# Patient Record
Sex: FEMALE | Race: WHITE | NOT HISPANIC OR LATINO | Employment: FULL TIME | URBAN - METROPOLITAN AREA
[De-identification: names, ages, dates, MRNs, and addresses within clinical notes are randomized per-mention and may not be internally consistent; named-entity substitution may affect disease eponyms.]

---

## 2018-07-12 ENCOUNTER — CONVERSION ENCOUNTER (OUTPATIENT)
Dept: GENERAL RADIOLOGY | Facility: HOSPITAL | Age: 43
End: 2018-07-12

## 2018-10-30 ENCOUNTER — OFFICE VISIT CONVERTED (OUTPATIENT)
Dept: CARDIOLOGY | Facility: CLINIC | Age: 43
End: 2018-10-30
Attending: SPECIALIST

## 2018-10-30 ENCOUNTER — CONVERSION ENCOUNTER (OUTPATIENT)
Dept: CARDIOLOGY | Facility: CLINIC | Age: 43
End: 2018-10-30

## 2019-11-05 ENCOUNTER — OFFICE VISIT CONVERTED (OUTPATIENT)
Dept: SURGERY | Facility: CLINIC | Age: 44
End: 2019-11-05
Attending: PHYSICIAN ASSISTANT

## 2019-11-05 ENCOUNTER — HOSPITAL ENCOUNTER (OUTPATIENT)
Dept: SURGERY | Facility: CLINIC | Age: 44
Discharge: HOME | End: 2019-11-05
Attending: PHYSICIAN ASSISTANT

## 2019-11-07 LAB — BACTERIA UR CULT: NORMAL

## 2019-11-11 ENCOUNTER — HOSPITAL ENCOUNTER (OUTPATIENT)
Dept: GENERAL RADIOLOGY | Facility: HOSPITAL | Age: 44
Discharge: HOME | End: 2019-11-11
Attending: PHYSICIAN ASSISTANT

## 2019-12-10 ENCOUNTER — OFFICE VISIT CONVERTED (OUTPATIENT)
Dept: SURGERY | Facility: CLINIC | Age: 44
End: 2019-12-10
Attending: PHYSICIAN ASSISTANT

## 2021-05-15 VITALS — RESPIRATION RATE: 12 BRPM | BODY MASS INDEX: 41.14 KG/M2 | HEIGHT: 66 IN | WEIGHT: 256 LBS

## 2021-05-15 VITALS — HEIGHT: 66 IN | WEIGHT: 255.12 LBS | BODY MASS INDEX: 41 KG/M2 | RESPIRATION RATE: 16 BRPM

## 2021-05-16 VITALS
WEIGHT: 255 LBS | HEART RATE: 98 BPM | HEIGHT: 66 IN | SYSTOLIC BLOOD PRESSURE: 126 MMHG | DIASTOLIC BLOOD PRESSURE: 84 MMHG | BODY MASS INDEX: 40.98 KG/M2

## 2021-10-05 ENCOUNTER — TRANSCRIBE ORDERS (OUTPATIENT)
Dept: ADMINISTRATIVE | Facility: HOSPITAL | Age: 46
End: 2021-10-05

## 2021-10-05 DIAGNOSIS — Z12.31 SCREENING MAMMOGRAM, ENCOUNTER FOR: Primary | ICD-10-CM

## 2021-12-28 ENCOUNTER — HOSPITAL ENCOUNTER (OUTPATIENT)
Dept: MAMMOGRAPHY | Facility: HOSPITAL | Age: 46
Discharge: HOME | End: 2021-12-28
Admitting: NURSE PRACTITIONER

## 2021-12-28 DIAGNOSIS — Z12.31 SCREENING MAMMOGRAM, ENCOUNTER FOR: ICD-10-CM

## 2021-12-28 PROCEDURE — 77067 SCR MAMMO BI INCL CAD: CPT

## 2021-12-28 PROCEDURE — 77063 BREAST TOMOSYNTHESIS BI: CPT

## 2022-03-16 ENCOUNTER — APPOINTMENT (OUTPATIENT)
Dept: GENERAL RADIOLOGY | Facility: HOSPITAL | Age: 47
End: 2022-03-16

## 2022-03-16 ENCOUNTER — HOSPITAL ENCOUNTER (EMERGENCY)
Facility: HOSPITAL | Age: 47
Discharge: HOME | End: 2022-03-16
Attending: EMERGENCY MEDICINE | Admitting: EMERGENCY MEDICINE

## 2022-03-16 VITALS
HEIGHT: 66 IN | TEMPERATURE: 98.8 F | SYSTOLIC BLOOD PRESSURE: 120 MMHG | OXYGEN SATURATION: 94 % | RESPIRATION RATE: 18 BRPM | WEIGHT: 266.1 LBS | HEART RATE: 99 BPM | BODY MASS INDEX: 42.77 KG/M2 | DIASTOLIC BLOOD PRESSURE: 71 MMHG

## 2022-03-16 DIAGNOSIS — R07.9 CHEST PAIN OF UNKNOWN ETIOLOGY: Primary | ICD-10-CM

## 2022-03-16 LAB
ALBUMIN SERPL-MCNC: 4.5 G/DL (ref 3.5–5.2)
ALBUMIN/GLOB SERPL: 1.4 G/DL
ALP SERPL-CCNC: 58 U/L (ref 39–117)
ALT SERPL W P-5'-P-CCNC: 8 U/L (ref 1–33)
ANION GAP SERPL CALCULATED.3IONS-SCNC: 12.5 MMOL/L (ref 5–15)
AST SERPL-CCNC: 9 U/L (ref 1–32)
BASOPHILS # BLD AUTO: 0.03 10*3/MM3 (ref 0–0.2)
BASOPHILS NFR BLD AUTO: 0.3 % (ref 0–1.5)
BILIRUB SERPL-MCNC: 0.2 MG/DL (ref 0–1.2)
BUN SERPL-MCNC: 17 MG/DL (ref 6–20)
BUN/CREAT SERPL: 22.7 (ref 7–25)
CALCIUM SPEC-SCNC: 10.6 MG/DL (ref 8.6–10.5)
CHLORIDE SERPL-SCNC: 101 MMOL/L (ref 98–107)
CK MB SERPL-CCNC: 1.49 NG/ML
CK SERPL-CCNC: 53 U/L (ref 20–180)
CO2 SERPL-SCNC: 23.5 MMOL/L (ref 22–29)
CREAT SERPL-MCNC: 0.75 MG/DL (ref 0.57–1)
DEPRECATED RDW RBC AUTO: 42.8 FL (ref 37–54)
EGFRCR SERPLBLD CKD-EPI 2021: 99.6 ML/MIN/1.73
EOSINOPHIL # BLD AUTO: 0.28 10*3/MM3 (ref 0–0.4)
EOSINOPHIL NFR BLD AUTO: 3.2 % (ref 0.3–6.2)
ERYTHROCYTE [DISTWIDTH] IN BLOOD BY AUTOMATED COUNT: 13.8 % (ref 12.3–15.4)
GLOBULIN UR ELPH-MCNC: 3.3 GM/DL
GLUCOSE SERPL-MCNC: 126 MG/DL (ref 65–99)
HCT VFR BLD AUTO: 38.8 % (ref 34–46.6)
HGB BLD-MCNC: 12.8 G/DL (ref 12–15.9)
HOLD SPECIMEN: NORMAL
HOLD SPECIMEN: NORMAL
IMM GRANULOCYTES # BLD AUTO: 0.03 10*3/MM3 (ref 0–0.05)
IMM GRANULOCYTES NFR BLD AUTO: 0.3 % (ref 0–0.5)
LIPASE SERPL-CCNC: 31 U/L (ref 13–60)
LYMPHOCYTES # BLD AUTO: 2.35 10*3/MM3 (ref 0.7–3.1)
LYMPHOCYTES NFR BLD AUTO: 26.9 % (ref 19.6–45.3)
MAGNESIUM SERPL-MCNC: 1.5 MG/DL (ref 1.6–2.6)
MCH RBC QN AUTO: 27.9 PG (ref 26.6–33)
MCHC RBC AUTO-ENTMCNC: 33 G/DL (ref 31.5–35.7)
MCV RBC AUTO: 84.7 FL (ref 79–97)
MONOCYTES # BLD AUTO: 0.5 10*3/MM3 (ref 0.1–0.9)
MONOCYTES NFR BLD AUTO: 5.7 % (ref 5–12)
NEUTROPHILS NFR BLD AUTO: 5.54 10*3/MM3 (ref 1.7–7)
NEUTROPHILS NFR BLD AUTO: 63.6 % (ref 42.7–76)
NRBC BLD AUTO-RTO: 0 /100 WBC (ref 0–0.2)
NT-PROBNP SERPL-MCNC: 26.9 PG/ML (ref 0–450)
PLATELET # BLD AUTO: 215 10*3/MM3 (ref 140–450)
PMV BLD AUTO: 10.1 FL (ref 6–12)
POTASSIUM SERPL-SCNC: 4.1 MMOL/L (ref 3.5–5.2)
PROT SERPL-MCNC: 7.8 G/DL (ref 6–8.5)
QT INTERVAL: 334 MS
QT INTERVAL: 350 MS
RBC # BLD AUTO: 4.58 10*6/MM3 (ref 3.77–5.28)
SODIUM SERPL-SCNC: 137 MMOL/L (ref 136–145)
TROPONIN I SERPL-MCNC: 0 NG/ML (ref 0–0.6)
TROPONIN I SERPL-MCNC: 0 NG/ML (ref 0–0.6)
WBC NRBC COR # BLD: 8.73 10*3/MM3 (ref 3.4–10.8)
WHOLE BLOOD HOLD SPECIMEN: NORMAL
WHOLE BLOOD HOLD SPECIMEN: NORMAL

## 2022-03-16 PROCEDURE — 36415 COLL VENOUS BLD VENIPUNCTURE: CPT

## 2022-03-16 PROCEDURE — 80053 COMPREHEN METABOLIC PANEL: CPT

## 2022-03-16 PROCEDURE — 93005 ELECTROCARDIOGRAM TRACING: CPT | Performed by: EMERGENCY MEDICINE

## 2022-03-16 PROCEDURE — 82553 CREATINE MB FRACTION: CPT | Performed by: EMERGENCY MEDICINE

## 2022-03-16 PROCEDURE — 83735 ASSAY OF MAGNESIUM: CPT

## 2022-03-16 PROCEDURE — 83690 ASSAY OF LIPASE: CPT

## 2022-03-16 PROCEDURE — 36415 COLL VENOUS BLD VENIPUNCTURE: CPT | Performed by: EMERGENCY MEDICINE

## 2022-03-16 PROCEDURE — 84484 ASSAY OF TROPONIN QUANT: CPT

## 2022-03-16 PROCEDURE — 85025 COMPLETE CBC W/AUTO DIFF WBC: CPT

## 2022-03-16 PROCEDURE — 99283 EMERGENCY DEPT VISIT LOW MDM: CPT

## 2022-03-16 PROCEDURE — 83880 ASSAY OF NATRIURETIC PEPTIDE: CPT

## 2022-03-16 PROCEDURE — 93005 ELECTROCARDIOGRAM TRACING: CPT

## 2022-03-16 PROCEDURE — 82550 ASSAY OF CK (CPK): CPT | Performed by: EMERGENCY MEDICINE

## 2022-03-16 PROCEDURE — 71045 X-RAY EXAM CHEST 1 VIEW: CPT

## 2022-03-16 RX ORDER — LEVOTHYROXINE SODIUM 0.1 MG/1
100 TABLET ORAL DAILY
COMMUNITY

## 2022-03-16 RX ORDER — ASPIRIN 81 MG/1
324 TABLET, CHEWABLE ORAL ONCE
Status: COMPLETED | OUTPATIENT
Start: 2022-03-16 | End: 2022-03-16

## 2022-03-16 RX ORDER — BIOTIN 1000 MCG
TABLET,CHEWABLE ORAL
COMMUNITY

## 2022-03-16 RX ORDER — OMEPRAZOLE 40 MG/1
40 CAPSULE, DELAYED RELEASE ORAL DAILY
Status: ON HOLD | COMMUNITY
End: 2022-04-19

## 2022-03-16 RX ORDER — AMITRIPTYLINE HYDROCHLORIDE 150 MG/1
150 TABLET, FILM COATED ORAL DAILY
COMMUNITY

## 2022-03-16 RX ORDER — DULAGLUTIDE 1.5 MG/.5ML
0.5 INJECTION, SOLUTION SUBCUTANEOUS WEEKLY
COMMUNITY

## 2022-03-16 RX ORDER — ESCITALOPRAM OXALATE 10 MG/1
10 TABLET ORAL DAILY
COMMUNITY

## 2022-03-16 RX ORDER — SODIUM CHLORIDE 0.9 % (FLUSH) 0.9 %
10 SYRINGE (ML) INJECTION AS NEEDED
Status: DISCONTINUED | OUTPATIENT
Start: 2022-03-16 | End: 2022-03-16 | Stop reason: HOSPADM

## 2022-03-16 RX ADMIN — ASPIRIN 81 MG CHEWABLE TABLET 324 MG: 81 TABLET CHEWABLE at 13:37

## 2022-03-16 NOTE — ED PROVIDER NOTES
Time: 4:09 PM EDT  Arrived by: private car  Chief Complaint: Chest pain, headache  History provided by: Patient  History is limited by: N/A     History of Present Illness:  Patient is a 46 y.o. year old female that presents to the emergency department with complaint of intermittent chest pain and headache over the past week.  Patient took 2 sublingual nitroglycerin prior to arrival.  Patient states her headache is slightly worse but her chest pain is resolved.    HPI    Similar Symptoms Previously: Yes  Recently seen: No      Patient Care Team  Primary Care Provider: Ellie June APRN    Past Medical History:     No Known Allergies  Past Medical History:   Diagnosis Date   • Diabetes mellitus (HCC)    • Hyperlipidemia    • Hypertension    • Migraine      Past Surgical History:   Procedure Laterality Date   •  SECTION     • CHOLECYSTECTOMY       History reviewed. No pertinent family history.    Home Medications:  Prior to Admission medications    Medication Sig Start Date End Date Taking? Authorizing Provider   amitriptyline (ELAVIL) 100 MG tablet 150 mg Daily.    ProviderMaribel MD   Biotin 1000 MCG chewable tablet biotin 1,000 mcg oral tablet,chewable chew 1 tablet by oral route daily   Active    ProviderMaribel MD   Cholecalciferol 25 MCG (1000 UT) capsule Vitamin D3 1,000 unit oral capsule take 1 capsule by oral route daily   Active    Maribel Franklin MD   Dulaglutide (Trulicity) 1.5 MG/0.5ML solution pen-injector Trulicity 1.5 mg/0.5 mL subcutaneous pen injector inject 0.5 milliliter (1.5 mg) by subcutaneous route every 7 days in the abdomen, thigh, or upper arm rotating injection sites   Active    Maribel Franklin MD   escitalopram (LEXAPRO) 10 MG tablet Take 10 mg by mouth Daily.    ProviderMaribel MD   levothyroxine (Synthroid) 100 MCG tablet Synthroid 100 mcg oral tablet take 1 tablet (100 mcg) by oral route once daily   Active    Maribel Franklin MD  "  metFORMIN (GLUCOPHAGE) 500 MG tablet metformin 500 mg oral tablet take 1 tablet (500 mg) by oral route 2 times per day with morning and evening meals   Active    Provider, MD Maribel   omeprazole (priLOSEC) 40 MG capsule Take 40 mg by mouth Daily.    Provider, Maribel, MD        Social History:   Social History     Tobacco Use   • Smoking status: Current Every Day Smoker     Packs/day: 1.00   Substance Use Topics   • Alcohol use: Not Currently       Review of Systems:  Review of Systems   Constitutional: Negative.    Eyes: Negative.    Respiratory: Negative.    Cardiovascular: Positive for chest pain.   Gastrointestinal: Negative.    Endocrine: Negative.    Genitourinary: Negative.    Musculoskeletal: Negative.    Skin: Negative.    Allergic/Immunologic: Negative.    Neurological: Positive for headaches.   Hematological: Negative.    Psychiatric/Behavioral: Negative.         Physical Exam:  /71   Pulse 99   Temp 98.8 °F (37.1 °C) (Oral)   Resp 18   Ht 167.6 cm (66\")   Wt 121 kg (266 lb 1.5 oz)   SpO2 94%   BMI 42.95 kg/m²     Physical Exam  Vitals and nursing note reviewed.   Constitutional:       Appearance: She is well-developed.   HENT:      Head: Normocephalic.   Cardiovascular:      Rate and Rhythm: Normal rate and regular rhythm.      Heart sounds: Normal heart sounds.   Pulmonary:      Effort: Pulmonary effort is normal.      Breath sounds: Normal breath sounds.   Abdominal:      Palpations: Abdomen is soft.   Musculoskeletal:         General: Normal range of motion.   Skin:     General: Skin is warm and dry.   Neurological:      General: No focal deficit present.      Mental Status: She is alert and oriented to person, place, and time.   Psychiatric:         Mood and Affect: Mood normal.                Medications in the Emergency Department:  Medications   aspirin chewable tablet 324 mg (324 mg Oral Given 3/16/22 5487)        Labs  Lab Results (last 24 hours)     ** No results found " for the last 24 hours. **           Imaging:  No Radiology Exams Resulted Within Past 24 Hours    Procedures:  Procedures    Progress                        HEART SCORE  History: Slightly Suspicious (0)  ECG: Normal (0)  Age: 45-64 years old (1)  Risk factors: 1-2 Risk Factors (1)  Troponin: normal (0)    This patient's HEART score is 2.    According to the HEART Score Study: Score (Risk of adverse cardiac event in the next 14 days)  Scores 0-3: (0.9-1.7%) In the HEART Score study, these patients were discharged home.  Scores 4-6: (12-16.6%)  In the HEART Score study, these patients were admitted to the hospital.  Scores ?7: (50-65%) In the HEART Score study, these patients were candidates for early invasive measures.   Final disposition is based on individual provider judgement and current clinical situation.    Medical Decision Making:  MDM   Patient's cardiac work-up is negative.  Patient is stable for discharge with outpatient cardiac stress test recommended.      Final diagnoses:   Chest pain of unknown etiology        Disposition:  ED Disposition     ED Disposition   Discharge    Condition   Stable    Comment   --             Documentation assistance provided by Harrison Boyd DO acting as scribe for Harrison Boyd DO. Information recorded by the scribe was done at my direction and has been verified and validated by me.       Harrison Boyd DO  03/20/22 1922

## 2022-03-28 ENCOUNTER — OFFICE VISIT (OUTPATIENT)
Dept: CARDIOLOGY | Facility: CLINIC | Age: 47
End: 2022-03-28

## 2022-03-28 VITALS
SYSTOLIC BLOOD PRESSURE: 113 MMHG | DIASTOLIC BLOOD PRESSURE: 61 MMHG | BODY MASS INDEX: 43.71 KG/M2 | HEIGHT: 66 IN | WEIGHT: 272 LBS | HEART RATE: 102 BPM

## 2022-03-28 DIAGNOSIS — R07.9 CHRONIC CHEST PAIN WITH HIGH RISK FOR CAD: Primary | ICD-10-CM

## 2022-03-28 DIAGNOSIS — Z91.89 CHRONIC CHEST PAIN WITH HIGH RISK FOR CAD: Primary | ICD-10-CM

## 2022-03-28 DIAGNOSIS — F17.210 CIGARETTE NICOTINE DEPENDENCE WITHOUT COMPLICATION: ICD-10-CM

## 2022-03-28 DIAGNOSIS — E78.5 HYPERLIPIDEMIA LDL GOAL <70: ICD-10-CM

## 2022-03-28 DIAGNOSIS — G89.29 CHRONIC CHEST PAIN WITH HIGH RISK FOR CAD: Primary | ICD-10-CM

## 2022-03-28 DIAGNOSIS — E66.01 MORBID OBESITY WITH BMI OF 40.0-44.9, ADULT: ICD-10-CM

## 2022-03-28 PROCEDURE — 99205 OFFICE O/P NEW HI 60 MIN: CPT | Performed by: INTERNAL MEDICINE

## 2022-03-28 RX ORDER — NITROGLYCERIN 40 MG/1
1 PATCH TRANSDERMAL SEE ADMIN INSTRUCTIONS
Qty: 30 PATCH | Refills: 11 | Status: SHIPPED | OUTPATIENT
Start: 2022-03-28

## 2022-03-28 RX ORDER — NITROGLYCERIN 0.4 MG/1
0.4 TABLET SUBLINGUAL
COMMUNITY
End: 2022-06-22 | Stop reason: SDUPTHER

## 2022-03-28 RX ORDER — CARVEDILOL 3.12 MG/1
3.12 TABLET ORAL NIGHTLY
Qty: 90 TABLET | Refills: 3 | Status: ON HOLD | OUTPATIENT
Start: 2022-03-28 | End: 2022-04-19 | Stop reason: SDUPTHER

## 2022-03-28 RX ORDER — ATORVASTATIN CALCIUM 40 MG/1
40 TABLET, FILM COATED ORAL DAILY
Qty: 90 TABLET | Refills: 3 | Status: SHIPPED | OUTPATIENT
Start: 2022-03-28 | End: 2022-12-28

## 2022-03-28 NOTE — ASSESSMENT & PLAN NOTE
Discussed with her low-cholesterol diet as well as weight reducing goals.  In addition, I am going to put her on Lipitor 40 mg at bedtime since he is at high risk for ischemic heart disease.

## 2022-03-28 NOTE — ASSESSMENT & PLAN NOTE
Patient has chronic chest pain some features are highly suggestive of ischemic heart disease while others are not.  We will start her on low-dose carvedilol 3.125 mg at bedtime along with nitroglycerin patch 0.2 mg/h daily DC nightly.  In addition, I am going to set  for nuclear stress test and an echocardiogram in the next 1 week.  She is to call us if her symptoms recur despite medications in which case we will skip the test and proceed with diagnostic cardiac cath.  She and her  are comfortable with this approach and prefer the conservative route.

## 2022-03-28 NOTE — ASSESSMENT & PLAN NOTE
Strongly urged her to quit smoking.  Counseling was performed.  5 minutes was spent discussing the issue.  Offered her nicotine patches but she prefers to go the  route with over-the-counter nicotine patches.

## 2022-03-28 NOTE — PROGRESS NOTES
New Patient Office Visit    Chief Complaint  Chest Pain    Subjective            Telly Sommers presents to [facility]  Zara is a 46 years old female who has been having episodes of chest pain for which she has undergone evaluations in the emergency room both of which were acutely negative.  She continues to complain of chest pain sometimes with exertion other times at rest that is left-sided sharp in character lasts  30 minutes sometimes.  Most of the time its brief in duration lasting several minutes and goes away as with rest.  She has tried taking sublingual nitroglycerin and has used it 8-10 times in the last several weeks.  She denies dizziness or syncope.  In the past when she was given metoprolol she had severe diarrhea and feeling of weakness and fatigue.  She still smokes.      Past Medical History:   Diagnosis Date   • Chest pain    • Diabetes mellitus (HCC)    • Hyperlipidemia    • Hypertension    • Migraine        No Known Allergies     Past Surgical History:   Procedure Laterality Date   • ANAL FISTULA REPAIR     •  SECTION     • CHOLECYSTECTOMY     • ENDOMETRIAL ABLATION          Social History     Tobacco Use   • Smoking status: Current Every Day Smoker     Packs/day: 1.00     Years: 34.00     Pack years: 34.00     Types: Cigarettes   • Smokeless tobacco: Never Used   Vaping Use   • Vaping Use: Never used   Substance Use Topics   • Alcohol use: Not Currently   • Drug use: Never       Family History   Problem Relation Age of Onset   • Heart attack Father    • Stroke Father    • Pulmonary embolism Father         Prior to Admission medications    Medication Sig Start Date End Date Taking? Authorizing Provider   amitriptyline (ELAVIL) 150 MG tablet 150 mg Daily.   Yes Maribel Franklin MD   Biotin 1000 MCG chewable tablet biotin 1,000 mcg oral tablet,chewable chew 1 tablet by oral route daily   Active   Yes Maribel Franklin MD   Cholecalciferol 25 MCG (1000  "UT) capsule Vitamin D3 1,000 unit oral capsule take 1 capsule by oral route daily   Active   Yes Maribel Franklin MD   Dulaglutide (Trulicity) 1.5 MG/0.5ML solution pen-injector Trulicity 1.5 mg/0.5 mL subcutaneous pen injector inject 0.5 milliliter (1.5 mg) by subcutaneous route every 7 days in the abdomen, thigh, or upper arm rotating injection sites   Active   Yes Maribel Franklin MD   escitalopram (LEXAPRO) 10 MG tablet Take 10 mg by mouth Daily.   Yes ProviderMaribel MD   levothyroxine (SYNTHROID, LEVOTHROID) 100 MCG tablet Synthroid 100 mcg oral tablet take 1 tablet (100 mcg) by oral route once daily   Active   Yes Maribel Franklin MD   metFORMIN (GLUCOPHAGE) 500 MG tablet metformin 500 mg oral tablet take 1 tablet (500 mg) by oral route 2 times per day with morning and evening meals   Active   Yes ProviderMaribel MD   nitroglycerin (Nitrostat) 0.4 MG SL tablet Place 0.4 mg under the tongue Every 5 (Five) Minutes As Needed for Chest Pain. Take no more than 3 doses in 15 minutes.   Yes ProviderMaribel MD   omeprazole (priLOSEC) 40 MG capsule Take 40 mg by mouth Daily.   Yes ProviderMaribel MD        Review of Systems   Constitutional: Negative for fatigue, unexpected weight gain and unexpected weight loss.   HENT: Negative for hearing loss, sinus pressure and swollen glands.    Eyes: Negative for blurred vision and double vision.   Respiratory: Positive for shortness of breath. Negative for cough and wheezing.    Cardiovascular: Positive for chest pain. Negative for palpitations and leg swelling.   Gastrointestinal: Negative for nausea and vomiting.   Endocrine: Negative for cold intolerance, heat intolerance, polydipsia and polyuria.   Musculoskeletal: Negative for arthralgias and back pain.   Neurological: Negative for dizziness, light-headedness and headache.   Hematological: Does not bruise/bleed easily.        Objective     /61   Pulse 102   Ht 167.6 cm (66\")  "  Wt 123 kg (272 lb)   BMI 43.90 kg/m²       Physical Exam  Constitutional:       General: She is awake. She is not in acute distress.     Appearance: Normal appearance.   HENT:      Nose: No congestion.   Eyes:      Extraocular Movements: Extraocular movements intact.      Conjunctiva/sclera: Conjunctivae normal.      Pupils: Pupils are equal, round, and reactive to light.   Neck:      Thyroid: No thyromegaly.      Vascular: No carotid bruit or JVD.   Cardiovascular:      Rate and Rhythm: Normal rate and regular rhythm.      Chest Wall: PMI is not displaced.      Pulses: Normal pulses.      Heart sounds: Normal heart sounds, S1 normal and S2 normal. No murmur heard.    No friction rub. No gallop. No S3 or S4 sounds.   Pulmonary:      Effort: Pulmonary effort is normal.      Breath sounds: Normal breath sounds and air entry. No wheezing, rhonchi or rales.   Chest:      Chest wall: No tenderness.   Abdominal:      General: Bowel sounds are normal.      Palpations: Abdomen is soft. There is no mass.      Tenderness: There is no abdominal tenderness.      Comments: Negative for hepatomegaly and splenomegaly   Musculoskeletal:      Cervical back: Neck supple. No tenderness.      Right lower leg: No edema.      Left lower leg: No edema.   Skin:     General: Skin is warm and dry.      Findings: No petechiae or rash.      Nails: There is no clubbing.   Neurological:      General: No focal deficit present.      Mental Status: She is alert and oriented to person, place, and time.   Psychiatric:         Mood and Affect: Mood normal.         Behavior: Behavior is cooperative.       Lab Results   Component Value Date    PROBNP 26.9 03/16/2022     CMP    CMP 3/16/22   Glucose 126 (A)   BUN 17   Creatinine 0.75   Sodium 137   Potassium 4.1   Chloride 101   Calcium 10.6 (A)   Albumin 4.50   Total Bilirubin 0.2   Alkaline Phosphatase 58   AST (SGOT) 9   ALT (SGPT) 8   (A) Abnormal value            CBC w/diff    CBC w/Diff 3/16/22    WBC 8.73   RBC 4.58   Hemoglobin 12.8   Hematocrit 38.8   MCV 84.7   MCH 27.9   MCHC 33.0   RDW 13.8   Platelets 215   Neutrophil Rel % 63.6   Immature Granulocyte Rel % 0.3   Lymphocyte Rel % 26.9   Monocyte Rel % 5.7   Eosinophil Rel % 3.2   Basophil Rel % 0.3               No results found for: TSH   No results found for: FREET4   No results found for: DDIMERQUANT  Magnesium   Date Value Ref Range Status   03/16/2022 1.5 (L) 1.6 - 2.6 mg/dL Final      No results found for: DIGOXIN      Outside labs were reviewed.  HDL 47 LDL 1 6 triglycerides 124 chemistry panel was normal CBC was normal.    EKG at outside hospital and asked me to manage revealed sinus rhythm normal axis no acute changes noted        Result Review :           Lab Results   Component Value Date    PROBNP 26.9 03/16/2022     CMP    CMP 3/16/22   Glucose 126 (A)   BUN 17   Creatinine 0.75   Sodium 137   Potassium 4.1   Chloride 101   Calcium 10.6 (A)   Albumin 4.50   Total Bilirubin 0.2   Alkaline Phosphatase 58   AST (SGOT) 9   ALT (SGPT) 8   (A) Abnormal value            CBC w/diff    CBC w/Diff 3/16/22   WBC 8.73   RBC 4.58   Hemoglobin 12.8   Hematocrit 38.8   MCV 84.7   MCH 27.9   MCHC 33.0   RDW 13.8   Platelets 215   Neutrophil Rel % 63.6   Immature Granulocyte Rel % 0.3   Lymphocyte Rel % 26.9   Monocyte Rel % 5.7   Eosinophil Rel % 3.2   Basophil Rel % 0.3               No results found for: TSH   No results found for: FREET4   No results found for: DDIMERQUANT  Magnesium   Date Value Ref Range Status   03/16/2022 1.5 (L) 1.6 - 2.6 mg/dL Final      No results found for: DIGOXIN                          Assessment and Plan        Diagnoses and all orders for this visit:    1. Chronic chest pain with high risk for CAD (Primary)  Assessment & Plan:  Patient has chronic chest pain some features are highly suggestive of ischemic heart disease while others are not.  We will start her on low-dose carvedilol 3.125 mg at bedtime along with  nitroglycerin patch 0.2 mg/h daily DC nightly.  In addition, I am going to set  for nuclear stress test and an echocardiogram in the next 1 week.  She is to call us if her symptoms recur despite medications in which case we will skip the test and proceed with diagnostic cardiac cath.  She and her  are comfortable with this approach and prefer the conservative route.    Orders:  -     Adult Transthoracic Echo Complete W/ Cont if Necessary Per Protocol; Future  -     Stress Test With Myocardial Perfusion Two Day; Future  -     CBC (No Diff); Future  -     Lipid Panel; Future  -     T4, Free; Future  -     TSH; Future    2. Hyperlipidemia LDL goal <70  Assessment & Plan:  Discussed with her low-cholesterol diet as well as weight reducing goals.  In addition, I am going to put her on Lipitor 40 mg at bedtime since he is at high risk for ischemic heart disease.    Orders:  -     Adult Transthoracic Echo Complete W/ Cont if Necessary Per Protocol; Future  -     Stress Test With Myocardial Perfusion Two Day; Future  -     CBC (No Diff); Future  -     Lipid Panel; Future  -     T4, Free; Future  -     TSH; Future    3. Cigarette nicotine dependence without complication  Assessment & Plan:  Strongly urged her to quit smoking.  Counseling was performed.  5 minutes was spent discussing the issue.  Offered her nicotine patches but she prefers to go the  route with over-the-counter nicotine patches.    Orders:  -     Adult Transthoracic Echo Complete W/ Cont if Necessary Per Protocol; Future  -     Stress Test With Myocardial Perfusion Two Day; Future  -     CBC (No Diff); Future  -     Lipid Panel; Future  -     T4, Free; Future  -     TSH; Future    4. Morbid obesity with BMI of 40.0-44.9, adult (HCC)    Other orders  -     nitroglycerin (NITRODUR) 0.2 MG/HR patch; Place 1 patch on the skin as directed by provider See Admin Instructions. Apply patch daily, remove at night for at least 10 hours  Dispense: 30  patch; Refill: 11  -     carvedilol (COREG) 3.125 MG tablet; Take 1 tablet by mouth Every Night.  Dispense: 90 tablet; Refill: 3  -     atorvastatin (LIPITOR) 40 MG tablet; Take 1 tablet by mouth Daily.  Dispense: 90 tablet; Refill: 3    Total time spent was 65 minutes which included reviewing prior records, face-to-face discussion with the patient and family of 35 minutes, arranging for stress test as soon as possible, documentation, etc.       Follow Up     Return for echo and stress test asap.    Patient was given instructions and counseling regarding her condition or for health maintenance advice. Please see specific information pulled into the AVS if appropriate.     Diego Carlson MD  03/28/22 13:31 EDT

## 2022-04-05 ENCOUNTER — HOSPITAL ENCOUNTER (OUTPATIENT)
Dept: NUCLEAR MEDICINE | Facility: HOSPITAL | Age: 47
Discharge: HOME | End: 2022-04-05

## 2022-04-05 ENCOUNTER — LAB (OUTPATIENT)
Dept: LAB | Facility: HOSPITAL | Age: 47
End: 2022-04-05

## 2022-04-05 DIAGNOSIS — Z91.89 CHRONIC CHEST PAIN WITH HIGH RISK FOR CAD: ICD-10-CM

## 2022-04-05 DIAGNOSIS — G89.29 CHRONIC CHEST PAIN WITH HIGH RISK FOR CAD: ICD-10-CM

## 2022-04-05 DIAGNOSIS — R07.9 CHRONIC CHEST PAIN WITH HIGH RISK FOR CAD: ICD-10-CM

## 2022-04-05 DIAGNOSIS — F17.210 CIGARETTE NICOTINE DEPENDENCE WITHOUT COMPLICATION: ICD-10-CM

## 2022-04-05 DIAGNOSIS — E78.5 HYPERLIPIDEMIA LDL GOAL <70: ICD-10-CM

## 2022-04-05 LAB
CHOLEST SERPL-MCNC: 132 MG/DL (ref 0–200)
DEPRECATED RDW RBC AUTO: 43.2 FL (ref 37–54)
ERYTHROCYTE [DISTWIDTH] IN BLOOD BY AUTOMATED COUNT: 14 % (ref 12.3–15.4)
HCT VFR BLD AUTO: 40.5 % (ref 34–46.6)
HDLC SERPL-MCNC: 35 MG/DL (ref 40–60)
HGB BLD-MCNC: 13.1 G/DL (ref 12–15.9)
LDLC SERPL CALC-MCNC: 50 MG/DL (ref 0–100)
LDLC/HDLC SERPL: 1.04 {RATIO}
MCH RBC QN AUTO: 27.6 PG (ref 26.6–33)
MCHC RBC AUTO-ENTMCNC: 32.3 G/DL (ref 31.5–35.7)
MCV RBC AUTO: 85.3 FL (ref 79–97)
PLATELET # BLD AUTO: 195 10*3/MM3 (ref 140–450)
PMV BLD AUTO: 10.7 FL (ref 6–12)
RBC # BLD AUTO: 4.75 10*6/MM3 (ref 3.77–5.28)
T4 FREE SERPL-MCNC: 1.1 NG/DL (ref 0.93–1.7)
TRIGL SERPL-MCNC: 303 MG/DL (ref 0–150)
TSH SERPL DL<=0.05 MIU/L-ACNC: 1.81 UIU/ML (ref 0.27–4.2)
VLDLC SERPL-MCNC: 47 MG/DL (ref 5–40)
WBC NRBC COR # BLD: 8.09 10*3/MM3 (ref 3.4–10.8)

## 2022-04-05 PROCEDURE — 25010000002 REGADENOSON 0.4 MG/5ML SOLUTION: Performed by: INTERNAL MEDICINE

## 2022-04-05 PROCEDURE — A9502 TC99M TETROFOSMIN: HCPCS | Performed by: INTERNAL MEDICINE

## 2022-04-05 PROCEDURE — 85027 COMPLETE CBC AUTOMATED: CPT

## 2022-04-05 PROCEDURE — 84439 ASSAY OF FREE THYROXINE: CPT

## 2022-04-05 PROCEDURE — 80061 LIPID PANEL: CPT

## 2022-04-05 PROCEDURE — 0 TECHNETIUM TETROFOSMIN KIT: Performed by: INTERNAL MEDICINE

## 2022-04-05 PROCEDURE — 78452 HT MUSCLE IMAGE SPECT MULT: CPT | Performed by: INTERNAL MEDICINE

## 2022-04-05 PROCEDURE — 84443 ASSAY THYROID STIM HORMONE: CPT

## 2022-04-05 PROCEDURE — 93017 CV STRESS TEST TRACING ONLY: CPT

## 2022-04-05 PROCEDURE — 93016 CV STRESS TEST SUPVJ ONLY: CPT | Performed by: INTERNAL MEDICINE

## 2022-04-05 PROCEDURE — 36415 COLL VENOUS BLD VENIPUNCTURE: CPT

## 2022-04-05 PROCEDURE — 93018 CV STRESS TEST I&R ONLY: CPT | Performed by: INTERNAL MEDICINE

## 2022-04-05 PROCEDURE — 78452 HT MUSCLE IMAGE SPECT MULT: CPT

## 2022-04-05 RX ADMIN — TETROFOSMIN 1 DOSE: 1.38 INJECTION, POWDER, LYOPHILIZED, FOR SOLUTION INTRAVENOUS at 12:00

## 2022-04-05 RX ADMIN — REGADENOSON 0.4 MG: 0.08 INJECTION, SOLUTION INTRAVENOUS at 11:47

## 2022-04-06 ENCOUNTER — TELEPHONE (OUTPATIENT)
Dept: CARDIOLOGY | Facility: CLINIC | Age: 47
End: 2022-04-06

## 2022-04-06 ENCOUNTER — HOSPITAL ENCOUNTER (OUTPATIENT)
Dept: NUCLEAR MEDICINE | Facility: HOSPITAL | Age: 47
Discharge: HOME | End: 2022-04-06

## 2022-04-06 ENCOUNTER — PATIENT ROUNDING (BHMG ONLY) (OUTPATIENT)
Dept: CARDIOLOGY | Facility: CLINIC | Age: 47
End: 2022-04-06

## 2022-04-06 DIAGNOSIS — E78.5 HYPERLIPIDEMIA LDL GOAL <70: Primary | ICD-10-CM

## 2022-04-06 PROCEDURE — A9502 TC99M TETROFOSMIN: HCPCS | Performed by: INTERNAL MEDICINE

## 2022-04-06 PROCEDURE — 0 TECHNETIUM TETROFOSMIN KIT: Performed by: INTERNAL MEDICINE

## 2022-04-06 RX ORDER — ICOSAPENT ETHYL 1000 MG/1
2 CAPSULE ORAL 2 TIMES DAILY WITH MEALS
Qty: 360 CAPSULE | Refills: 3 | Status: SHIPPED | OUTPATIENT
Start: 2022-04-06 | End: 2023-03-13 | Stop reason: SDUPTHER

## 2022-04-06 RX ADMIN — TETROFOSMIN 1 DOSE: 1.38 INJECTION, POWDER, LYOPHILIZED, FOR SOLUTION INTRAVENOUS at 10:59

## 2022-04-06 NOTE — TELEPHONE ENCOUNTER
----- Message from Miranda Marin sent at 4/6/2022  8:09 AM EDT -----    ----- Message -----  From: Halley Toney June, APRN  Sent: 4/6/2022   7:58 AM EDT  To: Miranda Marin      ----- Message -----  From: Miranda Marin  Sent: 4/6/2022   7:48 AM EDT  To: CORRY Dunaway      ----- Message -----  From: Halley Toney June, APRN  Sent: 4/6/2022   7:18 AM EDT  To: [facility] [place] [facility]    Thyroid is normal.  Cholesterols are mixed with triglycerides being very high.  Add Vascepa 1 g twice daily for 1 week and then 2 tabs twice daily.  Check labs in 3 months

## 2022-04-07 NOTE — PROGRESS NOTES
April 7, 2022    Hello, may I speak with Telly Sommers?    My name is Nita Hoang.      I am  with [facility]  [address] [place] [address] [phone].    Before we get started may I verify your date of birth? 1975    I am calling to officially welcome you to our practice and ask about your recent visit. Is this a good time to talk? yes    Tell me about your visit with us. What things went well? They had a short wait time, the doctor was very nice and explained everything really well so the patient understood what all is going on.        We're always looking for ways to make our patients' experiences even better. Do you have recommendations on ways we may improve?  no    Overall were you satisfied with your first visit to our practice? yes       I appreciate you taking the time to speak with me today. Is there anything else I can do for you? no      Thank you, and have a great day.

## 2022-04-08 LAB
BH CV IMMEDIATE POST RECOVERY TECH DATA SYMPTOMS: NORMAL
BH CV IMMEDIATE POST TECH DATA BLOOD PRESSURE: NORMAL MMHG
BH CV IMMEDIATE POST TECH DATA HEART RATE: 146 BPM
BH CV IMMEDIATE POST TECH DATA OXYGEN SATS: 96 %
BH CV REST NUCLEAR ISOTOPE DOSE: 34 MCI
BH CV SIX MINUTE RECOVERY TECH DATA BLOOD PRESSURE: NORMAL
BH CV SIX MINUTE RECOVERY TECH DATA HEART RATE: 119 BPM
BH CV SIX MINUTE RECOVERY TECH DATA OXYGEN SATURATION: 94 %
BH CV SIX MINUTE RECOVERY TECH DATA SYMPTOMS: NORMAL
BH CV STRESS BP STAGE 1: NORMAL
BH CV STRESS BP STAGE 2: NORMAL
BH CV STRESS COMMENTS STAGE 1: NORMAL
BH CV STRESS COMMENTS STAGE 2: NORMAL
BH CV STRESS DOSE REGADENOSON STAGE 1: 0.4
BH CV STRESS DURATION MIN STAGE 1: 2
BH CV STRESS DURATION MIN STAGE 2: 2
BH CV STRESS DURATION SEC STAGE 1: 0
BH CV STRESS DURATION SEC STAGE 2: 0
BH CV STRESS HR STAGE 1: 135
BH CV STRESS HR STAGE 2: 146
BH CV STRESS NUCLEAR ISOTOPE DOSE: 36.8 MCI
BH CV STRESS O2 STAGE 1: 98
BH CV STRESS PROTOCOL 1: NORMAL
BH CV STRESS RECOVERY BP: NORMAL MMHG
BH CV STRESS RECOVERY HR: 118 BPM
BH CV STRESS RECOVERY O2: 95 %
BH CV STRESS STAGE 1: 1
BH CV STRESS STAGE 2: 2
BH CV THREE MINUTE POST TECH DATA BLOOD PRESSURE: NORMAL MMHG
BH CV THREE MINUTE POST TECH DATA HEART RATE: 126 BPM
BH CV THREE MINUTE POST TECH DATA OXYGEN SATURATION: 98 %
BH CV THREE MINUTE RECOVERY TECH DATA SYMPTOM: NORMAL
LV EF NUC BP: 54 %
MAXIMAL PREDICTED HEART RATE: 174 BPM
PERCENT MAX PREDICTED HR: 83.91 %
STRESS BASELINE BP: NORMAL MMHG
STRESS BASELINE HR: 108 BPM
STRESS O2 SAT REST: 95 %
STRESS PERCENT HR: 99 %
STRESS POST ESTIMATED WORKLOAD: 2.3 METS
STRESS POST EXERCISE DUR MIN: 4 MIN
STRESS POST EXERCISE DUR SEC: 0 SEC
STRESS POST O2 SAT PEAK: 95 %
STRESS POST PEAK BP: NORMAL MMHG
STRESS POST PEAK HR: 146 BPM
STRESS TARGET HR: 148 BPM

## 2022-04-11 ENCOUNTER — TELEPHONE (OUTPATIENT)
Dept: CARDIOLOGY | Facility: CLINIC | Age: 47
End: 2022-04-11

## 2022-04-11 ENCOUNTER — PREP FOR SURGERY (OUTPATIENT)
Dept: OTHER | Facility: HOSPITAL | Age: 47
End: 2022-04-11

## 2022-04-11 DIAGNOSIS — R07.9 CHRONIC CHEST PAIN WITH LOW TO MODERATE RISK FOR CAD: Primary | ICD-10-CM

## 2022-04-11 DIAGNOSIS — G89.29 CHRONIC CHEST PAIN WITH LOW TO MODERATE RISK FOR CAD: Primary | ICD-10-CM

## 2022-04-11 DIAGNOSIS — Z91.89 CHRONIC CHEST PAIN WITH LOW TO MODERATE RISK FOR CAD: Primary | ICD-10-CM

## 2022-04-11 RX ORDER — SODIUM CHLORIDE 0.9 % (FLUSH) 0.9 %
10 SYRINGE (ML) INJECTION AS NEEDED
Status: CANCELLED | OUTPATIENT
Start: 2022-04-11

## 2022-04-11 RX ORDER — SODIUM CHLORIDE 9 MG/ML
1 INJECTION, SOLUTION INTRAVENOUS CONTINUOUS
Status: CANCELLED | OUTPATIENT
Start: 2022-04-11

## 2022-04-11 RX ORDER — SODIUM CHLORIDE 0.9 % (FLUSH) 0.9 %
3 SYRINGE (ML) INJECTION EVERY 12 HOURS SCHEDULED
Status: CANCELLED | OUTPATIENT
Start: 2022-04-11

## 2022-04-11 RX ORDER — DIAZEPAM 5 MG/1
10 TABLET ORAL ONCE
Status: CANCELLED | OUTPATIENT
Start: 2022-04-11 | End: 2022-04-11

## 2022-04-11 NOTE — TELEPHONE ENCOUNTER
Received VM from patient.    Returned call. Patient stated that Dr. Carlson called on Friday and discussed abnormal stress test and recommended patient to have a heart cath.     Patient discussed with  and decided to proceed with procedure.    Instructed to be NPO after midnight and someone to come with her that can drive her home.     Per med list patient is NOT on any blood thinners.    Please enter orders for 4/19/2022. Patient has NOT been COVID vaccinated.

## 2022-04-18 ENCOUNTER — LAB (OUTPATIENT)
Dept: LAB | Facility: HOSPITAL | Age: 47
End: 2022-04-18

## 2022-04-18 DIAGNOSIS — G89.29 CHRONIC CHEST PAIN WITH LOW TO MODERATE RISK FOR CAD: ICD-10-CM

## 2022-04-18 DIAGNOSIS — Z91.89 CHRONIC CHEST PAIN WITH LOW TO MODERATE RISK FOR CAD: ICD-10-CM

## 2022-04-18 DIAGNOSIS — R07.9 CHRONIC CHEST PAIN WITH LOW TO MODERATE RISK FOR CAD: ICD-10-CM

## 2022-04-18 LAB — SARS-COV-2 RNA PNL SPEC NAA+PROBE: NOT DETECTED

## 2022-04-18 PROCEDURE — U0004 COV-19 TEST NON-CDC HGH THRU: HCPCS

## 2022-04-18 PROCEDURE — C9803 HOPD COVID-19 SPEC COLLECT: HCPCS

## 2022-04-19 ENCOUNTER — APPOINTMENT (OUTPATIENT)
Dept: GENERAL RADIOLOGY | Facility: HOSPITAL | Age: 47
End: 2022-04-19

## 2022-04-19 ENCOUNTER — HOSPITAL ENCOUNTER (OUTPATIENT)
Facility: HOSPITAL | Age: 47
Setting detail: HOSPITAL OUTPATIENT SURGERY
Discharge: HOME | End: 2022-04-19
Attending: INTERNAL MEDICINE | Admitting: INTERNAL MEDICINE

## 2022-04-19 VITALS
HEIGHT: 66 IN | OXYGEN SATURATION: 94 % | RESPIRATION RATE: 16 BRPM | DIASTOLIC BLOOD PRESSURE: 74 MMHG | BODY MASS INDEX: 42.59 KG/M2 | SYSTOLIC BLOOD PRESSURE: 129 MMHG | TEMPERATURE: 98 F | WEIGHT: 265 LBS | HEART RATE: 89 BPM

## 2022-04-19 DIAGNOSIS — Z91.89 CHRONIC CHEST PAIN WITH LOW TO MODERATE RISK FOR CAD: ICD-10-CM

## 2022-04-19 DIAGNOSIS — G89.29 CHRONIC CHEST PAIN WITH LOW TO MODERATE RISK FOR CAD: ICD-10-CM

## 2022-04-19 DIAGNOSIS — R07.9 CHRONIC CHEST PAIN WITH LOW TO MODERATE RISK FOR CAD: ICD-10-CM

## 2022-04-19 LAB
ACT BLD: 220 SECONDS (ref 89–137)
ACT BLD: 243 SECONDS (ref 89–137)
ACT BLD: 255 SECONDS (ref 89–137)
ALBUMIN SERPL-MCNC: 4.3 G/DL (ref 3.5–5.2)
ALBUMIN/GLOB SERPL: 1.5 G/DL
ALP SERPL-CCNC: 96 U/L (ref 39–117)
ALT SERPL W P-5'-P-CCNC: 13 U/L (ref 1–33)
ANION GAP SERPL CALCULATED.3IONS-SCNC: 10.2 MMOL/L (ref 5–15)
AST SERPL-CCNC: 12 U/L (ref 1–32)
BILIRUB SERPL-MCNC: 0.2 MG/DL (ref 0–1.2)
BUN SERPL-MCNC: 11 MG/DL (ref 6–20)
BUN/CREAT SERPL: 15.5 (ref 7–25)
CALCIUM SPEC-SCNC: 9.3 MG/DL (ref 8.6–10.5)
CHLORIDE SERPL-SCNC: 105 MMOL/L (ref 98–107)
CHOLEST SERPL-MCNC: 136 MG/DL (ref 0–200)
CO2 SERPL-SCNC: 23.8 MMOL/L (ref 22–29)
CREAT SERPL-MCNC: 0.71 MG/DL (ref 0.57–1)
DEPRECATED RDW RBC AUTO: 43.8 FL (ref 37–54)
EGFRCR SERPLBLD CKD-EPI 2021: 106.3 ML/MIN/1.73
ERYTHROCYTE [DISTWIDTH] IN BLOOD BY AUTOMATED COUNT: 14 % (ref 12.3–15.4)
GLOBULIN UR ELPH-MCNC: 2.9 GM/DL
GLUCOSE SERPL-MCNC: 121 MG/DL (ref 65–99)
HCT VFR BLD AUTO: 37 % (ref 34–46.6)
HDLC SERPL-MCNC: 37 MG/DL (ref 40–60)
HGB BLD-MCNC: 12.6 G/DL (ref 12–15.9)
INR PPP: 0.94 (ref 2–3)
LDLC SERPL CALC-MCNC: 47 MG/DL (ref 0–100)
LDLC/HDLC SERPL: 0.81 {RATIO}
MAGNESIUM SERPL-MCNC: 1.8 MG/DL (ref 1.6–2.6)
MCH RBC QN AUTO: 28.8 PG (ref 26.6–33)
MCHC RBC AUTO-ENTMCNC: 34.1 G/DL (ref 31.5–35.7)
MCV RBC AUTO: 84.7 FL (ref 79–97)
PLATELET # BLD AUTO: 181 10*3/MM3 (ref 140–450)
PMV BLD AUTO: 9.6 FL (ref 6–12)
POTASSIUM SERPL-SCNC: 4.4 MMOL/L (ref 3.5–5.2)
PROT SERPL-MCNC: 7.2 G/DL (ref 6–8.5)
PROTHROMBIN TIME: 10 SECONDS (ref 9.4–12)
RBC # BLD AUTO: 4.37 10*6/MM3 (ref 3.77–5.28)
SODIUM SERPL-SCNC: 139 MMOL/L (ref 136–145)
TRIGL SERPL-MCNC: 346 MG/DL (ref 0–150)
VLDLC SERPL-MCNC: 52 MG/DL (ref 5–40)
WBC NRBC COR # BLD: 8.96 10*3/MM3 (ref 3.4–10.8)

## 2022-04-19 PROCEDURE — 85347 COAGULATION TIME ACTIVATED: CPT

## 2022-04-19 PROCEDURE — 80061 LIPID PANEL: CPT | Performed by: INTERNAL MEDICINE

## 2022-04-19 PROCEDURE — 71045 X-RAY EXAM CHEST 1 VIEW: CPT

## 2022-04-19 PROCEDURE — 85027 COMPLETE CBC AUTOMATED: CPT | Performed by: INTERNAL MEDICINE

## 2022-04-19 PROCEDURE — 93571 IV DOP VEL&/PRESS C FLO 1ST: CPT | Performed by: INTERNAL MEDICINE

## 2022-04-19 PROCEDURE — C1769 GUIDE WIRE: HCPCS | Performed by: INTERNAL MEDICINE

## 2022-04-19 PROCEDURE — C1887 CATHETER, GUIDING: HCPCS | Performed by: INTERNAL MEDICINE

## 2022-04-19 PROCEDURE — 99152 MOD SED SAME PHYS/QHP 5/>YRS: CPT | Performed by: INTERNAL MEDICINE

## 2022-04-19 PROCEDURE — 83735 ASSAY OF MAGNESIUM: CPT | Performed by: INTERNAL MEDICINE

## 2022-04-19 PROCEDURE — 25010000002 FENTANYL CITRATE (PF) 100 MCG/2ML SOLUTION: Performed by: INTERNAL MEDICINE

## 2022-04-19 PROCEDURE — 0 IOPAMIDOL PER 1 ML: Performed by: INTERNAL MEDICINE

## 2022-04-19 PROCEDURE — 80053 COMPREHEN METABOLIC PANEL: CPT | Performed by: INTERNAL MEDICINE

## 2022-04-19 PROCEDURE — 85610 PROTHROMBIN TIME: CPT | Performed by: INTERNAL MEDICINE

## 2022-04-19 PROCEDURE — 25010000002 HEPARIN (PORCINE) PER 1000 UNITS: Performed by: INTERNAL MEDICINE

## 2022-04-19 PROCEDURE — 93458 L HRT ARTERY/VENTRICLE ANGIO: CPT | Performed by: INTERNAL MEDICINE

## 2022-04-19 PROCEDURE — 25010000002 MIDAZOLAM PER 1 MG: Performed by: INTERNAL MEDICINE

## 2022-04-19 PROCEDURE — 99153 MOD SED SAME PHYS/QHP EA: CPT | Performed by: INTERNAL MEDICINE

## 2022-04-19 PROCEDURE — C1894 INTRO/SHEATH, NON-LASER: HCPCS | Performed by: INTERNAL MEDICINE

## 2022-04-19 PROCEDURE — 25010000002 ONDANSETRON PER 1 MG: Performed by: INTERNAL MEDICINE

## 2022-04-19 RX ORDER — DIAZEPAM 5 MG/1
10 TABLET ORAL ONCE
Status: COMPLETED | OUTPATIENT
Start: 2022-04-19 | End: 2022-04-19

## 2022-04-19 RX ORDER — ONDANSETRON 2 MG/ML
INJECTION INTRAMUSCULAR; INTRAVENOUS AS NEEDED
Status: DISCONTINUED | OUTPATIENT
Start: 2022-04-19 | End: 2022-04-19 | Stop reason: HOSPADM

## 2022-04-19 RX ORDER — HEPARIN SODIUM 1000 [USP'U]/ML
INJECTION, SOLUTION INTRAVENOUS; SUBCUTANEOUS AS NEEDED
Status: DISCONTINUED | OUTPATIENT
Start: 2022-04-19 | End: 2022-04-19 | Stop reason: HOSPADM

## 2022-04-19 RX ORDER — NITROGLYCERIN 5 MG/ML
INJECTION, SOLUTION INTRAVENOUS AS NEEDED
Status: DISCONTINUED | OUTPATIENT
Start: 2022-04-19 | End: 2022-04-19 | Stop reason: HOSPADM

## 2022-04-19 RX ORDER — ALUMINA, MAGNESIA, AND SIMETHICONE 2400; 2400; 240 MG/30ML; MG/30ML; MG/30ML
15 SUSPENSION ORAL EVERY 6 HOURS PRN
Status: DISCONTINUED | OUTPATIENT
Start: 2022-04-19 | End: 2022-04-19 | Stop reason: HOSPADM

## 2022-04-19 RX ORDER — SODIUM CHLORIDE 9 MG/ML
100 INJECTION, SOLUTION INTRAVENOUS CONTINUOUS
Status: DISCONTINUED | OUTPATIENT
Start: 2022-04-19 | End: 2022-04-19 | Stop reason: HOSPADM

## 2022-04-19 RX ORDER — ONDANSETRON 2 MG/ML
4 INJECTION INTRAMUSCULAR; INTRAVENOUS EVERY 6 HOURS PRN
Status: DISCONTINUED | OUTPATIENT
Start: 2022-04-19 | End: 2022-04-19 | Stop reason: HOSPADM

## 2022-04-19 RX ORDER — SODIUM CHLORIDE 9 MG/ML
1 INJECTION, SOLUTION INTRAVENOUS CONTINUOUS
Status: DISCONTINUED | OUTPATIENT
Start: 2022-04-19 | End: 2022-04-19 | Stop reason: HOSPADM

## 2022-04-19 RX ORDER — ACETAMINOPHEN 325 MG/1
650 TABLET ORAL EVERY 4 HOURS PRN
Status: DISCONTINUED | OUTPATIENT
Start: 2022-04-19 | End: 2022-04-19 | Stop reason: HOSPADM

## 2022-04-19 RX ORDER — ONDANSETRON 4 MG/1
4 TABLET, FILM COATED ORAL EVERY 6 HOURS PRN
Status: DISCONTINUED | OUTPATIENT
Start: 2022-04-19 | End: 2022-04-19 | Stop reason: HOSPADM

## 2022-04-19 RX ORDER — MIDAZOLAM HYDROCHLORIDE 1 MG/ML
INJECTION INTRAMUSCULAR; INTRAVENOUS AS NEEDED
Status: DISCONTINUED | OUTPATIENT
Start: 2022-04-19 | End: 2022-04-19 | Stop reason: HOSPADM

## 2022-04-19 RX ORDER — TEMAZEPAM 7.5 MG/1
7.5 CAPSULE ORAL NIGHTLY PRN
Status: DISCONTINUED | OUTPATIENT
Start: 2022-04-19 | End: 2022-04-19 | Stop reason: HOSPADM

## 2022-04-19 RX ORDER — SODIUM CHLORIDE 0.9 % (FLUSH) 0.9 %
10 SYRINGE (ML) INJECTION AS NEEDED
Status: DISCONTINUED | OUTPATIENT
Start: 2022-04-19 | End: 2022-04-19 | Stop reason: HOSPADM

## 2022-04-19 RX ORDER — FENTANYL CITRATE 50 UG/ML
INJECTION, SOLUTION INTRAMUSCULAR; INTRAVENOUS AS NEEDED
Status: DISCONTINUED | OUTPATIENT
Start: 2022-04-19 | End: 2022-04-19 | Stop reason: HOSPADM

## 2022-04-19 RX ORDER — ALPRAZOLAM 0.25 MG/1
0.5 TABLET ORAL 3 TIMES DAILY PRN
Status: DISCONTINUED | OUTPATIENT
Start: 2022-04-19 | End: 2022-04-19 | Stop reason: HOSPADM

## 2022-04-19 RX ORDER — CARVEDILOL 3.12 MG/1
3.12 TABLET ORAL 2 TIMES DAILY WITH MEALS
Qty: 180 TABLET | Refills: 3 | Status: SHIPPED | OUTPATIENT
Start: 2022-04-19 | End: 2022-06-02 | Stop reason: SDUPTHER

## 2022-04-19 RX ORDER — HYDROCODONE BITARTRATE AND ACETAMINOPHEN 5; 325 MG/1; MG/1
1 TABLET ORAL EVERY 4 HOURS PRN
Status: DISCONTINUED | OUTPATIENT
Start: 2022-04-19 | End: 2022-04-19 | Stop reason: HOSPADM

## 2022-04-19 RX ORDER — SODIUM CHLORIDE 0.9 % (FLUSH) 0.9 %
3 SYRINGE (ML) INJECTION EVERY 12 HOURS SCHEDULED
Status: DISCONTINUED | OUTPATIENT
Start: 2022-04-19 | End: 2022-04-19 | Stop reason: HOSPADM

## 2022-04-19 RX ADMIN — DIAZEPAM 10 MG: 5 TABLET ORAL at 09:59

## 2022-04-19 RX ADMIN — Medication 3 ML: at 07:30

## 2022-06-02 RX ORDER — CARVEDILOL 3.12 MG/1
3.12 TABLET ORAL 2 TIMES DAILY WITH MEALS
Qty: 180 TABLET | Refills: 3 | Status: SHIPPED | OUTPATIENT
Start: 2022-06-02

## 2022-06-15 NOTE — PROGRESS NOTES
Chief Complaint  Chest Pain    Subjective        Telly Sommers presents to [facility] CARDIOLOGY  She is a 46-year-old female comes in to evaluate her chest pain and lipidemia.  Recent diagnostic cardiac cath indicated no significant occlusive coronary artery disease.  She says that since her cath her chest pains are still present but they are much better and less severe.  Has not used any nitro for relief.  Shortness of breath is mostly with exertion its unchanged.  She continues to smoke.  She does not have any desire to stop but does have nicotine gums in her purse when she is ready.  Has occasional palpitations and dizziness but denies any syncope PND or orthopnea.     Past History:    Past Medical History:   Diagnosis Date   • Chest pain    • Diabetes mellitus (HCC)    • Hyperlipidemia    • Hypertension    • Migraine         Family History: family history includes Heart attack in her father; Pulmonary embolism in her father; Stroke in her father.     Social History: reports that she has been smoking cigarettes and cigarettes. She has a 8.50 pack-year smoking history. She has never used smokeless tobacco. She reports previous alcohol use. She reports that she does not use drugs.    Allergies: Patient has no known allergies.    Past Surgical History:   Procedure Laterality Date   • ANAL FISTULA REPAIR     • CARDIAC CATHETERIZATION Left 2022    Procedure: Left Heart Cath with coronary angiography and possible right heart cath. Possible PTCA/stent Insertion.;  Surgeon: Diego Carlson MD;  Location: [facility] INVASIVE LOCATION;  Service: Cardiovascular;  Laterality: Left;   • CARDIAC CATHETERIZATION  2022    Procedure: Functional Flow [place];  Surgeon: Diego Carlson MD;  Location: [facility] INVASIVE LOCATION;  Service: Cardiovascular;;   •  SECTION     • CHOLECYSTECTOMY     • ENDOMETRIAL ABLATION            Current Outpatient Medications:   •  amitriptyline (ELAVIL) 150 MG  tablet, 150 mg Daily., Disp: , Rfl:   •  atorvastatin (LIPITOR) 40 MG tablet, Take 1 tablet by mouth Daily., Disp: 90 tablet, Rfl: 3  •  Biotin 1000 MCG chewable tablet, biotin 1,000 mcg oral tablet,chewable chew 1 tablet by oral route daily   Active, Disp: , Rfl:   •  carvedilol (COREG) 3.125 MG tablet, Take 1 tablet by mouth 2 (Two) Times a Day With Meals., Disp: 180 tablet, Rfl: 3  •  Cholecalciferol 25 MCG (1000 UT) capsule, Vitamin D3 1,000 unit oral capsule take 1 capsule by oral route daily   Active, Disp: , Rfl:   •  Dulaglutide (Trulicity) 1.5 MG/0.5ML solution pen-injector, Inject 0.5 mL under the skin into the appropriate area as directed 1 (One) Time Per Week., Disp: , Rfl:   •  escitalopram (LEXAPRO) 10 MG tablet, Take 10 mg by mouth Daily., Disp: , Rfl:   •  levothyroxine (SYNTHROID, LEVOTHROID) 100 MCG tablet, Take 100 mcg by mouth Daily., Disp: , Rfl:   •  metFORMIN (GLUCOPHAGE) 500 MG tablet, Take 500 mg by mouth 2 (Two) Times a Day With Meals., Disp: , Rfl:   •  nitroglycerin (NITRODUR) 0.2 MG/HR patch, Place 1 patch on the skin as directed by provider See Admin Instructions. Apply patch daily, remove at night for at least 10 hours, Disp: 30 patch, Rfl: 11  •  nitroglycerin (NITROSTAT) 0.4 MG SL tablet, Place 1 tablet under the tongue Every 5 (Five) Minutes As Needed for Chest Pain. Take no more than 3 doses in 15 minutes., Disp: 25 tablet, Rfl: 3  •  Vascepa 1 g capsule capsule, Take 2 g by mouth 2 (Two) Times a Day With Meals., Disp: 360 capsule, Rfl: 3     Medications Discontinued During This Encounter   Medication Reason   • nitroglycerin (NITROSTAT) 0.4 MG SL tablet Reorder        Review of Systems   Constitutional: Positive for fatigue.   Respiratory: Positive for shortness of breath.    Cardiovascular: Positive for chest pain, palpitations and leg swelling.   Neurological: Positive for weakness.   All other systems reviewed and are negative.       Objective     Physical Exam  Constitutional:  "      General: She is not in acute distress.     Appearance: She is obese.      Comments: Accompanied by her son   Neck:      Vascular: No carotid bruit.   Cardiovascular:      Rate and Rhythm: Normal rate and regular rhythm.      Heart sounds: Normal heart sounds.   Pulmonary:      Effort: Pulmonary effort is normal.      Breath sounds: Normal breath sounds.   Musculoskeletal:      Right lower leg: No edema.      Left lower leg: No edema.   Neurological:      Mental Status: She is alert.       /64   Pulse 98   Ht 167.6 cm (65.98\")   Wt 119 kg (262 lb 6.4 oz)   BMI 42.38 kg/m²       Vitals:    06/22/22 1320   BP: 132/64   Pulse: 98       Result Review :         The following data was reviewed by: CORRY Dunaway on 06/22/2022:      Lab Results   Component Value Date    PROBNP 26.9 03/16/2022     CMP    CMP 3/16/22 4/19/22   Glucose 126 (A) 121 (A)   BUN 17 11   Creatinine 0.75 0.71   Sodium 137 139   Potassium 4.1 4.4   Chloride 101 105   Calcium 10.6 (A) 9.3   Albumin 4.50 4.30   Total Bilirubin 0.2 0.2   Alkaline Phosphatase 58 96   AST (SGOT) 9 12   ALT (SGPT) 8 13   (A) Abnormal value            CBC w/diff    CBC w/Diff 3/16/22 4/5/22 4/19/22   WBC 8.73 8.09 8.96   RBC 4.58 4.75 4.37   Hemoglobin 12.8 13.1 12.6   Hematocrit 38.8 40.5 37.0   MCV 84.7 85.3 84.7   MCH 27.9 27.6 28.8   MCHC 33.0 32.3 34.1   RDW 13.8 14.0 14.0   Platelets 215 195 181   Neutrophil Rel % 63.6     Immature Granulocyte Rel % 0.3     Lymphocyte Rel % 26.9     Monocyte Rel % 5.7     Eosinophil Rel % 3.2     Basophil Rel % 0.3              Lipid Panel    Lipid Panel 4/5/22 4/19/22   Total Cholesterol 132 136   Triglycerides 303 (A) 346 (A)   HDL Cholesterol 35 (A) 37 (A)   VLDL Cholesterol 47 (A) 52 (A)   LDL Cholesterol  50 47   LDL/HDL Ratio 1.04 0.81   (A) Abnormal value             Lab Results   Component Value Date    TSH 1.810 04/05/2022      Lab Results   Component Value Date    FREET4 1.10 04/05/2022      Magnesium "   Date Value Ref Range Status   2022 1.8 1.6 - 2.6 mg/dL Final        Last Echo  Results for orders placed in visit on 22    Adult Transthoracic Echo Complete W/ Cont if Necessary Per Protocol    Interpretation Summary  · Left ventricular wall thickness is consistent with mild concentric hypertrophy.  · Left ventricular ejection fraction appears to be 56 - 60%. Left ventricular systolic function is normal.  · Left ventricular diastolic function is consistent with (grade I) impaired relaxation.  · No significant valvular abnormalities are noted     Last Stress  Results for orders placed during the hospital encounter of 22    Stress Test With Myocardial Perfusion Two Day    Interpretation Summary  · Left ventricular ejection fraction is normal. (Calculated EF = 54%).  · Findings consistent with a normal ECG stress test.  · Myocardial perfusion imaging indicates a small-sized, mildly severe area of ischemia located in the anterior wall.  · Impressions are consistent with an intermediate risk study.     Results for orders placed during the hospital encounter of 22    Cardiac Catheterization/Vascular Study    [facility]  CARDIAC CATHETERIZATION PROCEDURE REPORT    Patient: Telly Sommers  : 1975  MRN: [id number]    Procedure Date:  22    Referring Physician:  Ellie June    Interventional Cardiologist:  Diego Carlson MD    Indication:  1. Ongoing chest pain despite medical treatment and abnormal nuclear stress test  2.    Clinical Presentation:  Ms. Sommers is a 46 years old female with ongoing chest pain with exertion that was partially relieved with low-dose beta-blockers.  She could not tolerate any higher than that.  She had an abnormal nuclear stress test and was continuing to have chest pain with activities that was limiting her and hence she was brought to the cardiac Cath Lab for coronary angiography    Procedure performed:  1. Coronary  angiography  2. Left heart catheterization  3. Left ventriculogram  4. Ultrasound guided vascular access.  5. Coronary flow reserve measurement including RFR FFR and IMR and CFR    Access Sites:  1. Right  radial artery    Findings:  1. Coronary Artery Anatomy:  Dominance: Right coronary artery  Left Main: Left main is short and normal  Left Anterior Descending: Left anterior descending coronary artery and its branches are without any significant stenosis  Circumflex Artery: Circumflex coronary artery is a nondominant vessel and is without any significant stenosis  Right Coronary Artery: Is a dominant vessel and gives off a small PDA and a good-sized posterolateral branch and neither the main vessel nor the branches have any significant stenosis.    Coronary flow measurements: Baseline RFR 0.96, CFR 3.0, IMR 11, peak adenosine FFR 0.98.  All normal measurements    2. Hemodynamics:  Left Ventricle: 127/12 mmHg  Aorta: 137/79 with a mean of 106 mmHg    3. Left Ventriculogram:  Ejection Fraction: 60%  Wall Motion: Normal  Mitral Regurgitation: Minimal    Conclusions:  1. No significant occlusive coronary artery disease  2. Coronary flow measurement indicates no evidence of microvascular dysfunction  3. Normal left ventricular systolic function    Recommendations:  1. Look for other causes of chest pain  2.  3.    Procedure Details:  Informed consent was obtained with an explanation of the risk and benefits of the procedure. The patient was brought to the Cardiac Catheterization Laboratory and was prepped and draped in a standard sterile fashion. Moderate sedation with Fentanyl and Versed was administered by the circulating nurse. Lidocaine 2% was used to anesthetize the right radial artery and a 5/6 Slender sheath was placed with the assistance of ultrasound guidance.  A Nic catheter was then advanced over a 0.035 Inquire guidewire into the ascending aorta.  This catheter was used to engage the right and left  coronary arteries with diagnostic angiography obtained.  We then exchanged for an angled pigtail catheter and enter the left ventricle to measure hemodynamics and perform a left ventriculogram.  The catheter was then removed over a guidewire.  At this stage the decision was made regarding coronary flow measurement.  The catheter was exchanged for a initially XB LAD 3.0 followed by LAD 3.5 followed by Ikkari 3.5 guide catheter.  The FFR wire was advanced equilibrated in the proximal left main and advanced into the distal LAD. RFR  measurement was obtained and following this a 3 cc injection of saline was injected to perform INR measurements.  Patient also received 300 mcg of intracoronary nitroglycerin prior to the management.  After baseline measurements were performed we started an adenosine infusion at 140 mcg/kg/min.  The CFR in diameter measurements were repeated.  Finally an FFR value was obtained and the infusion of adenosine was stopped.  Final angiography was obtained and the catheter was removed over the guidewire.  A TR band was used for radial artery hemostasis.    Patient tolerated the procedure well without any complications, and transferred to the post procedure area for recovery in a stable condition.    Complications:  None.    Estimated Blood Loss:  Minimal.    Contrast used: 172 ml    Diego Carlson MD    04/19/22  11:51 EDT             Assessment and Plan    Diagnoses and all orders for this visit:    1. Hyperlipidemia LDL goal <70 (Primary)  Assessment & Plan:  Mixed hyperlipidemia with LDL at goal but triglycerides worsening continue Vascepa 2 g twice daily and atorvastatin 40.    Orders:  -     Lipid Panel; Future  -     Comprehensive Metabolic Panel; Future    2. Chronic chest pain with high risk for CAD  Assessment & Plan:  Chest pains improving.  Discussed the effects of nicotine on chest discomfort.  Continue Nitropatch 0.2 mg/h and nitro as needed.    Orders:  -     nitroglycerin  (NITROSTAT) 0.4 MG SL tablet; Place 1 tablet under the tongue Every 5 (Five) Minutes As Needed for Chest Pain. Take no more than 3 doses in 15 minutes.  Dispense: 25 tablet; Refill: 3    3. Cigarette nicotine dependence without complication  Assessment & Plan:  I have educated the patient on the risk of diseases from using tobacco products such as heart disease. Patient verbalizes understanding of the need for smoking cessation but is unwilling to attempt at this time.  Has nicotine gums in her purse for when she is ready.              Follow Up     Return in about 9 months (around 3/22/2023) for with Ramon (per pt request).    Patient was given instructions and counseling regarding her condition or for health maintenance advice. Please see specific information pulled into the AVS if appropriate.       CORRY Frazier  06/22/22 10:20 EDT

## 2022-06-15 NOTE — ASSESSMENT & PLAN NOTE
Mixed hyperlipidemia with LDL at goal but triglycerides worsening continue Vascepa 2 g twice daily and atorvastatin 40.

## 2022-06-22 ENCOUNTER — OFFICE VISIT (OUTPATIENT)
Dept: CARDIOLOGY | Facility: CLINIC | Age: 47
End: 2022-06-22

## 2022-06-22 VITALS
BODY MASS INDEX: 42.17 KG/M2 | SYSTOLIC BLOOD PRESSURE: 132 MMHG | DIASTOLIC BLOOD PRESSURE: 64 MMHG | WEIGHT: 262.4 LBS | HEIGHT: 66 IN | HEART RATE: 98 BPM

## 2022-06-22 DIAGNOSIS — R07.9 CHRONIC CHEST PAIN WITH HIGH RISK FOR CAD: Chronic | ICD-10-CM

## 2022-06-22 DIAGNOSIS — E78.5 HYPERLIPIDEMIA LDL GOAL <70: Primary | Chronic | ICD-10-CM

## 2022-06-22 DIAGNOSIS — F17.210 CIGARETTE NICOTINE DEPENDENCE WITHOUT COMPLICATION: ICD-10-CM

## 2022-06-22 DIAGNOSIS — Z91.89 CHRONIC CHEST PAIN WITH HIGH RISK FOR CAD: Chronic | ICD-10-CM

## 2022-06-22 DIAGNOSIS — G89.29 CHRONIC CHEST PAIN WITH HIGH RISK FOR CAD: Chronic | ICD-10-CM

## 2022-06-22 PROCEDURE — 99214 OFFICE O/P EST MOD 30 MIN: CPT | Performed by: NURSE PRACTITIONER

## 2022-06-22 RX ORDER — NITROGLYCERIN 0.4 MG/1
0.4 TABLET SUBLINGUAL
Qty: 25 TABLET | Refills: 3 | Status: SHIPPED | OUTPATIENT
Start: 2022-06-22

## 2022-06-22 NOTE — ASSESSMENT & PLAN NOTE
Chest pains improving.  Discussed the effects of nicotine on chest discomfort.  Continue Nitropatch 0.2 mg/h and nitro as needed.

## 2022-06-22 NOTE — ASSESSMENT & PLAN NOTE
I have educated the patient on the risk of diseases from using tobacco products such as heart disease. Patient verbalizes understanding of the need for smoking cessation but is unwilling to attempt at this time.  Has nicotine gums in her purse for when she is ready.

## 2022-08-05 ENCOUNTER — TELEPHONE (OUTPATIENT)
Dept: CARDIOLOGY | Facility: CLINIC | Age: 47
End: 2022-08-05

## 2022-12-28 RX ORDER — ATORVASTATIN CALCIUM 40 MG/1
40 TABLET, FILM COATED ORAL DAILY
Qty: 90 TABLET | Refills: 0 | Status: SHIPPED | OUTPATIENT
Start: 2022-12-28 | End: 2023-03-22 | Stop reason: SDUPTHER

## 2023-03-13 DIAGNOSIS — E78.5 HYPERLIPIDEMIA LDL GOAL <70: ICD-10-CM

## 2023-03-13 RX ORDER — ICOSAPENT ETHYL 1000 MG/1
2 CAPSULE ORAL 2 TIMES DAILY WITH MEALS
Qty: 360 CAPSULE | Refills: 3 | Status: SHIPPED | OUTPATIENT
Start: 2023-03-13

## 2023-03-22 ENCOUNTER — OFFICE VISIT (OUTPATIENT)
Dept: CARDIOLOGY | Facility: CLINIC | Age: 48
End: 2023-03-22
Payer: COMMERCIAL

## 2023-03-22 VITALS
DIASTOLIC BLOOD PRESSURE: 89 MMHG | BODY MASS INDEX: 43.39 KG/M2 | HEIGHT: 66 IN | HEART RATE: 100 BPM | SYSTOLIC BLOOD PRESSURE: 143 MMHG | WEIGHT: 270 LBS

## 2023-03-22 DIAGNOSIS — F17.210 CIGARETTE NICOTINE DEPENDENCE WITHOUT COMPLICATION: ICD-10-CM

## 2023-03-22 DIAGNOSIS — E78.5 HYPERLIPIDEMIA LDL GOAL <70: Primary | ICD-10-CM

## 2023-03-22 DIAGNOSIS — Z91.89 CHRONIC CHEST PAIN WITH HIGH RISK FOR CAD: ICD-10-CM

## 2023-03-22 DIAGNOSIS — G89.29 CHRONIC CHEST PAIN WITH HIGH RISK FOR CAD: ICD-10-CM

## 2023-03-22 DIAGNOSIS — R07.9 CHRONIC CHEST PAIN WITH HIGH RISK FOR CAD: ICD-10-CM

## 2023-03-22 PROCEDURE — 99214 OFFICE O/P EST MOD 30 MIN: CPT | Performed by: INTERNAL MEDICINE

## 2023-03-22 RX ORDER — ATORVASTATIN CALCIUM 40 MG/1
40 TABLET, FILM COATED ORAL DAILY
Qty: 90 TABLET | Refills: 3 | Status: SHIPPED | OUTPATIENT
Start: 2023-03-22

## 2023-03-22 NOTE — ASSESSMENT & PLAN NOTE
Heart cath done last year showing no occlusive disease suspect symptoms likely muscle skeletal in nature strongly encourage patient on the importance of smoking cessation and risk reduction

## 2023-03-22 NOTE — PROGRESS NOTES
Chief Complaint  Follow-up and Hyperlipidemia    Subjective    Patient with persistent intermittent atypical chest pain spells lasting for about 30 minutes not exertional in nature.  Not increasing frequency or intensity    Past Medical History:   Diagnosis Date   • Chest pain    • Diabetes mellitus (HCC)    • Hyperlipidemia    • Hypertension    • Migraine          Current Outpatient Medications:   •  amitriptyline (ELAVIL) 150 MG tablet, 1 tablet Daily., Disp: , Rfl:   •  atorvastatin (LIPITOR) 40 MG tablet, TAKE 1 TABLET BY MOUTH DAILY, Disp: 90 tablet, Rfl: 0  •  Biotin 1000 MCG chewable tablet, biotin 1,000 mcg oral tablet,chewable chew 1 tablet by oral route daily   Active, Disp: , Rfl:   •  carvedilol (COREG) 3.125 MG tablet, Take 1 tablet by mouth 2 (Two) Times a Day With Meals., Disp: 180 tablet, Rfl: 3  •  Cholecalciferol 25 MCG (1000 UT) capsule, Vitamin D3 1,000 unit oral capsule take 1 capsule by oral route daily   Active, Disp: , Rfl:   •  Dulaglutide (Trulicity) 1.5 MG/0.5ML solution pen-injector, Inject 1.5 mg under the skin into the appropriate area as directed 1 (One) Time Per Week., Disp: , Rfl:   •  escitalopram (LEXAPRO) 10 MG tablet, Take 1 tablet by mouth Daily., Disp: , Rfl:   •  levothyroxine (SYNTHROID, LEVOTHROID) 100 MCG tablet, Take 1 tablet by mouth Daily., Disp: , Rfl:   •  metFORMIN (GLUCOPHAGE) 500 MG tablet, Take 1 tablet by mouth 2 (Two) Times a Day With Meals., Disp: , Rfl:   •  nitroglycerin (NITRODUR) 0.2 MG/HR patch, Place 1 patch on the skin as directed by provider See Admin Instructions. Apply patch daily, remove at night for at least 10 hours, Disp: 30 patch, Rfl: 11  •  nitroglycerin (NITROSTAT) 0.4 MG SL tablet, Place 1 tablet under the tongue Every 5 (Five) Minutes As Needed for Chest Pain. Take no more than 3 doses in 15 minutes., Disp: 25 tablet, Rfl: 3  •  Vascepa 1 g capsule capsule, Take 2 g by mouth 2 (Two) Times a Day With Meals., Disp: 360 capsule, Rfl: 3    There  "are no discontinued medications.  No Known Allergies     Social History     Tobacco Use   • Smoking status: Every Day     Packs/day: 0.25     Years: 34.00     Pack years: 8.50     Types: Cigarettes   • Smokeless tobacco: Never   Vaping Use   • Vaping Use: Never used   Substance Use Topics   • Alcohol use: Not Currently   • Drug use: Never       Family History   Problem Relation Age of Onset   • Heart attack Father    • Stroke Father    • Pulmonary embolism Father         Objective     /89   Pulse 100   Ht 167.6 cm (65.98\")   Wt 122 kg (270 lb)   BMI 43.61 kg/m²       Physical Exam    General Appearance:   · no acute distress  · Alert and oriented x3  HENT:   · lips not cyanotic  · Atraumatic triglycerides still remain elevated encouraged dietary modification  Neck:  · No jvd   · supple  Respiratory:  · no respiratory distress  · normal breath sounds  · no rales  Cardiovascular:  · Regular rate and rhythm  · no S3, no S4   · no murmur  · no rub  Extremities  · No cyanosis  · lower extremity edema: none    Skin:   · warm, dry  · No rashes      Result Review :     proBNP   Date Value Ref Range Status   03/16/2022 26.9 0.0 - 450.0 pg/mL Final     CMP    CMP 4/19/22   Glucose 121 (A)   BUN 11   Creatinine 0.71   eGFR 106.3   Sodium 139   Potassium 4.4   Chloride 105   Calcium 9.3   Total Protein 7.2   Albumin 4.30   Globulin 2.9   Total Bilirubin 0.2   Alkaline Phosphatase 96   AST (SGOT) 12   ALT (SGPT) 13   Albumin/Globulin Ratio 1.5   BUN/Creatinine Ratio 15.5   Anion Gap 10.2   (A) Abnormal value       Comments are available for some flowsheets but are not being displayed.           CBC w/diff    CBC w/Diff 4/5/22 4/19/22   WBC 8.09 8.96   RBC 4.75 4.37   Hemoglobin 13.1 12.6   Hematocrit 40.5 37.0   MCV 85.3 84.7   MCH 27.6 28.8   MCHC 32.3 34.1   RDW 14.0 14.0   Platelets 195 181            Lab Results   Component Value Date    TSH 1.810 04/05/2022      Lab Results   Component Value Date    FREET4 1.10 " 04/05/2022      No results found for: DDIMERQUANT  Magnesium   Date Value Ref Range Status   04/19/2022 1.8 1.6 - 2.6 mg/dL Final      No results found for: DIGOXIN   No results found for: TROPONINT        Lipid Panel    Lipid Panel 4/5/22 4/19/22   Total Cholesterol 132 136   Triglycerides 303 (A) 346 (A)   HDL Cholesterol 35 (A) 37 (A)   VLDL Cholesterol 47 (A) 52 (A)   LDL Cholesterol  50 47   LDL/HDL Ratio 1.04 0.81   (A) Abnormal value            Lab Results   Component Value Date    POCTROP 0.00 03/16/2022       Results for orders placed in visit on 03/31/22    Adult Transthoracic Echo Complete W/ Cont if Necessary Per Protocol    Interpretation Summary  · Left ventricular wall thickness is consistent with mild concentric hypertrophy.  · Left ventricular ejection fraction appears to be 56 - 60%. Left ventricular systolic function is normal.  · Left ventricular diastolic function is consistent with (grade I) impaired relaxation.  · No significant valvular abnormalities are noted                 Diagnoses and all orders for this visit:    1. Hyperlipidemia LDL goal <70 (Primary)  Assessment & Plan:  Continue on atorvastatin 40 nightly and Vascepa LDL levels currently are at goal      2. Cigarette nicotine dependence without complication  Assessment & Plan:  Counseled on the health benefits of smoking cessation patient was not interested in trying      3. Chronic chest pain with high risk for CAD  Assessment & Plan:  Heart cath done last year showing no occlusive disease suspect symptoms likely muscle skeletal in nature strongly encourage patient on the importance of smoking cessation and risk reduction            Follow Up     Return in about 1 year (around 3/22/2024) for EKG with F/U, Follow with Aliyah Golden.          Patient was given instructions and counseling regarding her condition or for health maintenance advice. Please see specific information pulled into the AVS if appropriate.

## 2023-03-28 ENCOUNTER — TRANSCRIBE ORDERS (OUTPATIENT)
Dept: ADMINISTRATIVE | Facility: HOSPITAL | Age: 48
End: 2023-03-28
Payer: COMMERCIAL

## 2023-03-28 DIAGNOSIS — Z12.31 SCREENING MAMMOGRAM, ENCOUNTER FOR: Primary | ICD-10-CM

## 2023-05-01 RX ORDER — NITROGLYCERIN 40 MG/1
PATCH TRANSDERMAL
Qty: 30 PATCH | Refills: 11 | Status: SHIPPED | OUTPATIENT
Start: 2023-05-01

## 2023-06-05 RX ORDER — CARVEDILOL 3.12 MG/1
TABLET ORAL
Qty: 180 TABLET | Refills: 3 | Status: SHIPPED | OUTPATIENT
Start: 2023-06-05

## 2023-09-15 DIAGNOSIS — E78.5 HYPERLIPIDEMIA LDL GOAL <70: ICD-10-CM

## 2023-09-15 RX ORDER — ICOSAPENT ETHYL 1000 MG/1
CAPSULE ORAL
Qty: 360 CAPSULE | Refills: 3 | OUTPATIENT
Start: 2023-09-15

## 2023-10-11 ENCOUNTER — HOSPITAL ENCOUNTER (OUTPATIENT)
Dept: MAMMOGRAPHY | Facility: HOSPITAL | Age: 48
Discharge: HOME | End: 2023-10-11
Admitting: NURSE PRACTITIONER
Payer: COMMERCIAL

## 2023-10-11 DIAGNOSIS — Z12.31 SCREENING MAMMOGRAM, ENCOUNTER FOR: ICD-10-CM

## 2023-10-11 PROCEDURE — 77063 BREAST TOMOSYNTHESIS BI: CPT

## 2023-10-11 PROCEDURE — 77067 SCR MAMMO BI INCL CAD: CPT

## 2024-04-07 DIAGNOSIS — E78.5 HYPERLIPIDEMIA LDL GOAL <70: ICD-10-CM

## 2024-04-09 RX ORDER — ICOSAPENT ETHYL 1000 MG/1
2 CAPSULE ORAL 2 TIMES DAILY WITH MEALS
Qty: 360 CAPSULE | Refills: 3 | Status: SHIPPED | OUTPATIENT
Start: 2024-04-09

## 2024-06-06 RX ORDER — CARVEDILOL 3.12 MG/1
3.12 TABLET ORAL 2 TIMES DAILY WITH MEALS
Qty: 180 TABLET | Refills: 0 | Status: SHIPPED | OUTPATIENT
Start: 2024-06-06

## 2024-06-06 NOTE — TELEPHONE ENCOUNTER
Rx Refill Note  Requested Prescriptions     Pending Prescriptions Disp Refills    carvedilol (COREG) 3.125 MG tablet [Pharmacy Med Name: CARVEDILOL 3.125MG TABLETS] 180 tablet 3     Sig: TAKE 1 TABLET BY MOUTH TWICE DAILY WITH MEALS      Last office visit with prescribing clinician: 3/22/23    Next office visit with prescribing clinician: Visit date not found     Need appointment     Elena Flaherty MA  06/06/24, 11:22 EDT

## 2024-06-07 RX ORDER — CARVEDILOL 3.12 MG/1
3.12 TABLET ORAL 2 TIMES DAILY WITH MEALS
Qty: 180 TABLET | Refills: 0 | OUTPATIENT
Start: 2024-06-07

## 2024-06-13 DIAGNOSIS — E78.5 HYPERLIPIDEMIA LDL GOAL <70: ICD-10-CM

## 2024-06-14 RX ORDER — ATORVASTATIN CALCIUM 40 MG/1
40 TABLET, FILM COATED ORAL DAILY
Qty: 90 TABLET | Refills: 3 | OUTPATIENT
Start: 2024-06-14

## 2024-10-04 ENCOUNTER — TELEPHONE (OUTPATIENT)
Dept: CARDIOLOGY | Facility: CLINIC | Age: 49
End: 2024-10-04
Payer: COMMERCIAL

## 2024-10-04 NOTE — TELEPHONE ENCOUNTER
[facility] received a fax that requires your attention. The document has been indexed to the patient’s chart for your review.      Reason for sending: EXTERNAL MEDICAL RECORD NOTIFICATION     Documents Description: CARVEDILOL [id number]    Name of Sender: SHELLY     Date Indexed: 10.3.24

## 2025-03-09 DIAGNOSIS — E78.5 HYPERLIPIDEMIA LDL GOAL <70: ICD-10-CM

## 2025-03-10 RX ORDER — ICOSAPENT ETHYL 1 G/1
2 CAPSULE ORAL 2 TIMES DAILY WITH MEALS
Qty: 360 CAPSULE | Refills: 3 | Status: SHIPPED | OUTPATIENT
Start: 2025-03-10

## (undated) DEVICE — MODEL AT P65, P/N 701554-001KIT CONTENTS: HAND CONTROLLER, 3-WAY HIGH-PRESSURE STOPCOCK WITH ROTATING END AND PREMIUM HIGH-PRESSURE TUBING: Brand: ANGIOTOUCH® KIT

## (undated) DEVICE — MIT PREP PRE/OP 5.5X7IN

## (undated) DEVICE — GW PRESSUREWIRE X WIRELESS FFR 175CM

## (undated) DEVICE — GLV SURG SENSICARE SLT PF LF 7.5 STRL

## (undated) DEVICE — GW INQWIRE FC PTFE STD J/1.5 .035 260

## (undated) DEVICE — SENSR O2 OXIMAX FNGR ADHS A/ 1P/U

## (undated) DEVICE — CANNULA,OXY,ADULT,SUPER SOFT,W/14'TUB,UC: Brand: MEDLINE INDUSTRIES, INC.

## (undated) DEVICE — CVR PROB ULTRASND GLS STRL

## (undated) DEVICE — Device

## (undated) DEVICE — CATH 4F INF PIG 145Â° 110 CM: Brand: INFINITI

## (undated) DEVICE — CONTRST ISOVUE370 76PCT 100ML

## (undated) DEVICE — MODEL BT2000 P/N 700287-012KIT CONTENTS: MANIFOLD WITH SALINE AND CONTRAST PORTS, SALINE TUBING WITH SPIKE AND HAND SYRINGE, TRANSDUCER: Brand: BT2000 AUTOMATED MANIFOLD KIT

## (undated) DEVICE — GLV SURG SENSICARE PI LF PF 8 GRN STRL

## (undated) DEVICE — HEARTRAIL III GUIDING CATHETER: Brand: HEARTRAIL

## (undated) DEVICE — RADIFOCUS OPTITORQUE ANGIOGRAPHIC CATHETER: Brand: OPTITORQUE

## (undated) DEVICE — 6F .070 JL3.5 100CM: Brand: CORDIS

## (undated) DEVICE — CATH F6 ST JR 4 100CM: Brand: SUPERTORQUE

## (undated) DEVICE — SHT AIR TRANSFR COMFRT GLIDE LAT 40X80IN

## (undated) DEVICE — BLD CLIP UNIV SURG GRY

## (undated) DEVICE — GLIDESHEATH SLENDER STAINLESS STEEL KIT: Brand: GLIDESHEATH SLENDER

## (undated) DEVICE — DECANTER: Brand: UNBRANDED

## (undated) DEVICE — BRACHIAL/AXILLARY/CEREBRAL DRAPE 38 1/2" X 60": Brand: BRACHIAL/AXILLARY/CEREBRAL DRAPE

## (undated) DEVICE — 6F .070 XBLAD3 100CM: Brand: VISTA BRITE TIP

## (undated) DEVICE — TR BAND RADIAL ARTERY COMPRESSION DEVICE: Brand: TR BAND

## (undated) DEVICE — CATH LAB PACK: Brand: MEDLINE INDUSTRIES, INC.

## (undated) DEVICE — DRSNG SURESITE WNDW 4X4.5

## (undated) DEVICE — GLV SURG SENSICARE SLT PF LF 8 STRL

## (undated) DEVICE — APPL CHLORAPREP 1ML CLR STRL

## (undated) DEVICE — A2000 MULTI-USE SYRINGE KIT, P/N 701277-003KIT CONTENTS: 100ML CONTRAST RESERVOIR AND TUBING WITH CONTRAST SPIKE AND CLAMP: Brand: A2000 MULTI-USE SYRINGE KIT

## (undated) DEVICE — COPILOT BLEEDBACK CONTROL VALVE: Brand: COPILOT